# Patient Record
Sex: FEMALE | URBAN - METROPOLITAN AREA
[De-identification: names, ages, dates, MRNs, and addresses within clinical notes are randomized per-mention and may not be internally consistent; named-entity substitution may affect disease eponyms.]

---

## 2017-05-19 VITALS
OXYGEN SATURATION: 99 % | TEMPERATURE: 98 F | DIASTOLIC BLOOD PRESSURE: 72 MMHG | SYSTOLIC BLOOD PRESSURE: 134 MMHG | HEART RATE: 85 BPM | WEIGHT: 132.5 LBS | HEIGHT: 64 IN | RESPIRATION RATE: 16 BRPM

## 2017-05-22 ENCOUNTER — INPATIENT (INPATIENT)
Facility: HOSPITAL | Age: 52
LOS: 1 days | Discharge: HOME | DRG: 27 | End: 2017-05-24
Attending: NEUROLOGICAL SURGERY | Admitting: NEUROLOGICAL SURGERY
Payer: COMMERCIAL

## 2017-05-22 DIAGNOSIS — G50.0 TRIGEMINAL NEURALGIA: ICD-10-CM

## 2017-05-22 DIAGNOSIS — Z98.890 OTHER SPECIFIED POSTPROCEDURAL STATES: Chronic | ICD-10-CM

## 2017-05-22 LAB
ANION GAP SERPL CALC-SCNC: 13 MMOL/L — SIGNIFICANT CHANGE UP (ref 5–17)
APTT BLD: 32.4 SEC — SIGNIFICANT CHANGE UP (ref 27.5–37.4)
BUN SERPL-MCNC: 10 MG/DL — SIGNIFICANT CHANGE UP (ref 7–23)
CALCIUM SERPL-MCNC: 9.2 MG/DL — SIGNIFICANT CHANGE UP (ref 8.4–10.5)
CHLORIDE SERPL-SCNC: 98 MMOL/L — SIGNIFICANT CHANGE UP (ref 96–108)
CO2 SERPL-SCNC: 26 MMOL/L — SIGNIFICANT CHANGE UP (ref 22–31)
CREAT SERPL-MCNC: 0.8 MG/DL — SIGNIFICANT CHANGE UP (ref 0.5–1.3)
GLUCOSE SERPL-MCNC: 108 MG/DL — HIGH (ref 70–99)
HCT VFR BLD CALC: 41.9 % — SIGNIFICANT CHANGE UP (ref 34.5–45)
HGB BLD-MCNC: 14.2 G/DL — SIGNIFICANT CHANGE UP (ref 11.5–15.5)
INR BLD: 0.99 — SIGNIFICANT CHANGE UP (ref 0.88–1.16)
MAGNESIUM SERPL-MCNC: 1.9 MG/DL — SIGNIFICANT CHANGE UP (ref 1.6–2.6)
MCHC RBC-ENTMCNC: 31.8 PG — SIGNIFICANT CHANGE UP (ref 27–34)
MCHC RBC-ENTMCNC: 33.9 G/DL — SIGNIFICANT CHANGE UP (ref 32–36)
MCV RBC AUTO: 93.9 FL — SIGNIFICANT CHANGE UP (ref 80–100)
PHOSPHATE SERPL-MCNC: 2.8 MG/DL — SIGNIFICANT CHANGE UP (ref 2.5–4.5)
PLATELET # BLD AUTO: 280 K/UL — SIGNIFICANT CHANGE UP (ref 150–400)
POTASSIUM SERPL-MCNC: 4.4 MMOL/L — SIGNIFICANT CHANGE UP (ref 3.5–5.3)
POTASSIUM SERPL-SCNC: 4.4 MMOL/L — SIGNIFICANT CHANGE UP (ref 3.5–5.3)
PROTHROM AB SERPL-ACNC: 11 SEC — SIGNIFICANT CHANGE UP (ref 9.8–12.7)
RBC # BLD: 4.46 M/UL — SIGNIFICANT CHANGE UP (ref 3.8–5.2)
RBC # FLD: 13.1 % — SIGNIFICANT CHANGE UP (ref 10.3–16.9)
SODIUM SERPL-SCNC: 137 MMOL/L — SIGNIFICANT CHANGE UP (ref 135–145)
WBC # BLD: 4.7 K/UL — SIGNIFICANT CHANGE UP (ref 3.8–10.5)
WBC # FLD AUTO: 4.7 K/UL — SIGNIFICANT CHANGE UP (ref 3.8–10.5)

## 2017-05-22 PROCEDURE — 61322 CRNEC/CRNOT DCMPRV W/O LOBEC: CPT | Mod: AS

## 2017-05-22 PROCEDURE — 70450 CT HEAD/BRAIN W/O DYE: CPT | Mod: 26

## 2017-05-22 PROCEDURE — 99291 CRITICAL CARE FIRST HOUR: CPT | Mod: 24

## 2017-05-22 RX ORDER — CHOLECALCIFEROL (VITAMIN D3) 125 MCG
2000 CAPSULE ORAL DAILY
Qty: 0 | Refills: 0 | Status: DISCONTINUED | OUTPATIENT
Start: 2017-05-22 | End: 2017-05-24

## 2017-05-22 RX ORDER — SODIUM CHLORIDE 9 MG/ML
1000 INJECTION INTRAMUSCULAR; INTRAVENOUS; SUBCUTANEOUS
Qty: 0 | Refills: 0 | Status: DISCONTINUED | OUTPATIENT
Start: 2017-05-22 | End: 2017-05-23

## 2017-05-22 RX ORDER — SODIUM CHLORIDE 9 MG/ML
1000 INJECTION, SOLUTION INTRAVENOUS
Qty: 0 | Refills: 0 | Status: DISCONTINUED | OUTPATIENT
Start: 2017-05-22 | End: 2017-05-24

## 2017-05-22 RX ORDER — INSULIN LISPRO 100/ML
VIAL (ML) SUBCUTANEOUS EVERY 6 HOURS
Qty: 0 | Refills: 0 | Status: DISCONTINUED | OUTPATIENT
Start: 2017-05-22 | End: 2017-05-22

## 2017-05-22 RX ORDER — CHOLECALCIFEROL (VITAMIN D3) 125 MCG
1 CAPSULE ORAL
Qty: 0 | Refills: 0 | COMMUNITY

## 2017-05-22 RX ORDER — LOVASTATIN 20 MG
1 TABLET ORAL
Qty: 0 | Refills: 0 | COMMUNITY

## 2017-05-22 RX ORDER — INSULIN LISPRO 100/ML
VIAL (ML) SUBCUTANEOUS
Qty: 0 | Refills: 0 | Status: DISCONTINUED | OUTPATIENT
Start: 2017-05-22 | End: 2017-05-24

## 2017-05-22 RX ORDER — LAMOTRIGINE 25 MG/1
1 TABLET, ORALLY DISINTEGRATING ORAL
Qty: 0 | Refills: 0 | COMMUNITY

## 2017-05-22 RX ORDER — ONDANSETRON 8 MG/1
4 TABLET, FILM COATED ORAL EVERY 6 HOURS
Qty: 0 | Refills: 0 | Status: DISCONTINUED | OUTPATIENT
Start: 2017-05-22 | End: 2017-05-22

## 2017-05-22 RX ORDER — DEXTROSE 50 % IN WATER 50 %
1 SYRINGE (ML) INTRAVENOUS ONCE
Qty: 0 | Refills: 0 | Status: DISCONTINUED | OUTPATIENT
Start: 2017-05-22 | End: 2017-05-24

## 2017-05-22 RX ORDER — DEXTROSE 50 % IN WATER 50 %
25 SYRINGE (ML) INTRAVENOUS ONCE
Qty: 0 | Refills: 0 | Status: DISCONTINUED | OUTPATIENT
Start: 2017-05-22 | End: 2017-05-24

## 2017-05-22 RX ORDER — DEXTROSE 50 % IN WATER 50 %
12.5 SYRINGE (ML) INTRAVENOUS ONCE
Qty: 0 | Refills: 0 | Status: DISCONTINUED | OUTPATIENT
Start: 2017-05-22 | End: 2017-05-24

## 2017-05-22 RX ORDER — ACETAMINOPHEN 500 MG
1000 TABLET ORAL ONCE
Qty: 0 | Refills: 0 | Status: COMPLETED | OUTPATIENT
Start: 2017-05-22 | End: 2017-05-22

## 2017-05-22 RX ORDER — ATORVASTATIN CALCIUM 80 MG/1
10 TABLET, FILM COATED ORAL AT BEDTIME
Qty: 0 | Refills: 0 | Status: DISCONTINUED | OUTPATIENT
Start: 2017-05-22 | End: 2017-05-24

## 2017-05-22 RX ORDER — ONDANSETRON 8 MG/1
4 TABLET, FILM COATED ORAL ONCE
Qty: 0 | Refills: 0 | Status: DISCONTINUED | OUTPATIENT
Start: 2017-05-22 | End: 2017-05-22

## 2017-05-22 RX ORDER — LAMOTRIGINE 25 MG/1
100 TABLET, ORALLY DISINTEGRATING ORAL DAILY
Qty: 0 | Refills: 0 | Status: DISCONTINUED | OUTPATIENT
Start: 2017-05-22 | End: 2017-05-24

## 2017-05-22 RX ORDER — FENTANYL CITRATE 50 UG/ML
12.5 INJECTION INTRAVENOUS ONCE
Qty: 0 | Refills: 0 | Status: DISCONTINUED | OUTPATIENT
Start: 2017-05-22 | End: 2017-05-22

## 2017-05-22 RX ORDER — FENTANYL CITRATE 50 UG/ML
25 INJECTION INTRAVENOUS ONCE
Qty: 0 | Refills: 0 | Status: DISCONTINUED | OUTPATIENT
Start: 2017-05-22 | End: 2017-05-22

## 2017-05-22 RX ORDER — DOCUSATE SODIUM 100 MG
100 CAPSULE ORAL ONCE
Qty: 0 | Refills: 0 | Status: COMPLETED | OUTPATIENT
Start: 2017-05-22 | End: 2017-05-22

## 2017-05-22 RX ORDER — INSULIN LISPRO 100/ML
VIAL (ML) SUBCUTANEOUS ONCE
Qty: 0 | Refills: 0 | Status: DISCONTINUED | OUTPATIENT
Start: 2017-05-22 | End: 2017-05-22

## 2017-05-22 RX ORDER — ONDANSETRON 8 MG/1
4 TABLET, FILM COATED ORAL EVERY 6 HOURS
Qty: 0 | Refills: 0 | Status: DISCONTINUED | OUTPATIENT
Start: 2017-05-22 | End: 2017-05-24

## 2017-05-22 RX ORDER — SENNA PLUS 8.6 MG/1
2 TABLET ORAL ONCE
Qty: 0 | Refills: 0 | Status: DISCONTINUED | OUTPATIENT
Start: 2017-05-22 | End: 2017-05-22

## 2017-05-22 RX ORDER — CEFAZOLIN SODIUM 1 G
1000 VIAL (EA) INJECTION EVERY 8 HOURS
Qty: 0 | Refills: 0 | Status: COMPLETED | OUTPATIENT
Start: 2017-05-22 | End: 2017-05-23

## 2017-05-22 RX ORDER — SENNA PLUS 8.6 MG/1
2 TABLET ORAL AT BEDTIME
Qty: 0 | Refills: 0 | Status: DISCONTINUED | OUTPATIENT
Start: 2017-05-22 | End: 2017-05-24

## 2017-05-22 RX ORDER — GLUCAGON INJECTION, SOLUTION 0.5 MG/.1ML
1 INJECTION, SOLUTION SUBCUTANEOUS ONCE
Qty: 0 | Refills: 0 | Status: DISCONTINUED | OUTPATIENT
Start: 2017-05-22 | End: 2017-05-24

## 2017-05-22 RX ADMIN — FENTANYL CITRATE 12.5 MICROGRAM(S): 50 INJECTION INTRAVENOUS at 12:10

## 2017-05-22 RX ADMIN — SENNA PLUS 2 TABLET(S): 8.6 TABLET ORAL at 22:05

## 2017-05-22 RX ADMIN — ATORVASTATIN CALCIUM 10 MILLIGRAM(S): 80 TABLET, FILM COATED ORAL at 22:05

## 2017-05-22 RX ADMIN — FENTANYL CITRATE 12.5 MICROGRAM(S): 50 INJECTION INTRAVENOUS at 11:49

## 2017-05-22 RX ADMIN — SODIUM CHLORIDE 70 MILLILITER(S): 9 INJECTION INTRAMUSCULAR; INTRAVENOUS; SUBCUTANEOUS at 11:57

## 2017-05-22 RX ADMIN — Medication 100 MILLIGRAM(S): at 17:03

## 2017-05-22 RX ADMIN — Medication 1000 MILLIGRAM(S): at 15:40

## 2017-05-22 RX ADMIN — LAMOTRIGINE 100 MILLIGRAM(S): 25 TABLET, ORALLY DISINTEGRATING ORAL at 17:03

## 2017-05-22 RX ADMIN — FENTANYL CITRATE 25 MICROGRAM(S): 50 INJECTION INTRAVENOUS at 13:20

## 2017-05-22 RX ADMIN — FENTANYL CITRATE 25 MICROGRAM(S): 50 INJECTION INTRAVENOUS at 12:49

## 2017-05-22 RX ADMIN — Medication 1 TABLET(S): at 17:04

## 2017-05-22 RX ADMIN — Medication 400 MILLIGRAM(S): at 14:54

## 2017-05-22 RX ADMIN — Medication 2000 UNIT(S): at 17:04

## 2017-05-22 NOTE — H&P PST ADULT - HISTORY OF PRESENT ILLNESS
Pt is 50yo female, PMH: HLD, impaired fasting glucose, Vit D deficiency, Lumbar radiculopathy, L trigeminal neuralgia, s/p L breast biopsy - benign, presents to [facility] for elective retromastoid decompression of Trigeminal nerve after failing conservative treatment.  Pt was medically cleared, clearance is in the chart, pt denies sob, cp, chills/fevers, dysuria.

## 2017-05-22 NOTE — PROGRESS NOTE ADULT - ASSESSMENT
51F with trigeminal neuralgia s/p microvascular decompression of facial nerve (05/22/17, Dr. Donaldson), dyslipidemia 51F with trigeminal neuralgia s/p microvascular decompression of trigeminal nerve (05/22/17, Dr. Donaldson), dyslipidemia

## 2017-05-22 NOTE — PROGRESS NOTE ADULT - SUBJECTIVE AND OBJECTIVE BOX
=================================  NEUROCRITICAL CARE ATTENDING NOTE  =================================    NETTA MONTE   MRN-[id number]  Summary:  51F with dyslipidemia and impaired fasting glucose, Vit D deficiency, lumbar radiculopathy, h/o L trigeminal neuralgia, failed medical treatment.  Today presents for elective trigeminal nerve decompression 05/22.    Overnight Events: Admitted to NSICU this pm    PAST MEDICAL & SURGICAL HISTORY: Trigeminal neuralgia HLD (hyperlipidemia) No pertinent past medical history H/O benign breast biopsy: L breast biopsy  NKDA  PMH: MVI lovastatin 20mg daily lamotrigine 100mg daily vit D3 daily    PHYSICAL EXAMINATION  T(C): , Max: 36.4 (05-22 @ 13:00)  HR: 82 (74 - 90) BP: 135/70 (127/69 - 146/83) RR: 12 (12 - 23) SpO2: 99% (99% - 100%)  NEUROLOGIC EXAMINATION:  Patient is awake, alert, fully oriented, pupils 2-3mm equal and briskly reactive to light, EOMs intact, moving all 4s, V1-V3 intact  GENERAL:  not intubated, not in cardiorespiratory distress  EENT: anicteric  CARDIOVASC:  (+) S1 S2, normal rate and regular rhythm  PULMONARY:  clear to auscultation bilaterally  ABDOMEN:  soft, nontender, with normoactive bowel sounds  EXTREMITIES:  no edema  SKIN:  no rash    LABS:  CAPILLARY BLOOD GLUCOSE 112 (22 May 2017 12:30)                        14.2   4.7   )-----------( 280      ( 22 May 2017 12:52 )             41.9     137  |  98  |  10  ----------------------------<  108<H>  4.4   |  26  |  0.80    Ca    9.2      22 May 2017 12:51  Phos  2.8     05-22  Mg     1.9     05-22    Bacteriology:  CSF studies:  EEG:  Neuroimaging: no recent imaging available  Other imaging:    MEDICATIONS: docusate ancef 1g q8h lamotrigine 100mg daily atorvastatin 10mg HS MVI cholecalciferol 2g daily  senna mod ISS percocet    IV FLUIDS: NS@70cc/hr  DRIPS:  DIET:   Lines / Drains:      CODE STATUS:  full code                       GOALS OF CARE:  aggressive                      DISPOSITION:  ICU

## 2017-05-22 NOTE — PROGRESS NOTE ADULT - SUBJECTIVE AND OBJECTIVE BOX
NEUROSURGERY POST-OP CHECK:    HPI:  Pt is 52yo female, PMH: HLD, impaired fasting glucose, Vit D deficiency, Lumbar radiculopathy, L trigeminal neuralgia, s/p L breast biopsy - benign, presents to [facility] for elective retromastoid decompression of Trigeminal nerve after failing conservative treatment.  Pt was medically cleared, clearance is in the chart, pt denies sob, cp, chills/fevers, dysuria. (22 May 2017 11:26)    OVERNIGHT EVENTS:  Pt seen and examined at bedside. c/o left sided headache (incisional)    Vital Signs Last 24 Hrs  T(C): 36.2, Max: 36.2 (05-22 @ 11:15)  T(F): 97.2, Max: 97.2 (05-22 @ 11:15)  HR: 76 (74 - 82)  BP: 139/77 (139/77 - 146/83)  BP(mean): 100 (100 - 109)  RR: 20 (12 - 23)  SpO2: 100% (100% - 100%)    I&O's Detail    I & Os for current day (as of 22 May 2017 12:47)  =============================================  IN:    sodium chloride 0.9%.: 70 ml    Total IN: 70 ml  ---------------------------------------------  OUT:    Total OUT: 0 ml  ---------------------------------------------  Total NET: 70 ml    I&O's Summary    I & Os for current day (as of 22 May 2017 12:47)  =============================================  IN: 70 ml / OUT: 0 ml / NET: 70 ml      PHYSICAL EXAM:  Neurological:  AAOx3, coherent speech, FC  CNII-XII grossly intact, PERRL, EOMI, normal vision, face symmetric, tongue midline, no facial numbness/tingling  MAEx4, strength 5/5 UE and LE b/l, no drift  SILT throughout b/l      TUBES/LINES:  [] CVC  [] A-line  [] Lumbar Drain  [] Ventriculostomy  [] Other    DIET:  [] NPO  [x] Mechanical  [] Tube feeds    LABS:                  CAPILLARY BLOOD GLUCOSE      Drug Levels: [] N/A    CSF Analysis: [] N/A      Allergies    No Known Allergies    Intolerances      MEDICATIONS:  Antibiotics:  ceFAZolin   IVPB 1000milliGRAM(s) IV Intermittent every 8 hours    Neuro:  acetaminophen  IVPB. 1000milliGRAM(s) IV Intermittent Once  lamoTRIgine 100milliGRAM(s) Oral daily  ondansetron Injectable 4milliGRAM(s) IV Push every 6 hours PRN  fentaNYL    Injectable 25MICROGram(s) IV Push once    Anticoagulation:    OTHER:  docusate sodium 100milliGRAM(s) Oral Once  dextrose Gel 1Dose(s) Oral Once PRN  dextrose 50% Injectable 12.5Gram(s) IV Push Once  dextrose 50% Injectable 25Gram(s) IV Push Once  dextrose 50% Injectable 25Gram(s) IV Push Once  glucagon  Injectable 1milliGRAM(s) IntraMuscular Once PRN  atorvastatin 10milliGRAM(s) Oral at bedtime  insulin lispro (HumaLOG) corrective regimen sliding scale  SubCutaneous every 6 hours  dextrose Gel 1Dose(s) Oral once PRN  dextrose 50% Injectable 12.5Gram(s) IV Push once  dextrose 50% Injectable 25Gram(s) IV Push once  dextrose 50% Injectable 25Gram(s) IV Push once  glucagon  Injectable 1milliGRAM(s) IntraMuscular once PRN  senna 2Tablet(s) Oral at bedtime    IVF:  sodium chloride 0.9%. 1000milliLiter(s) IV Continuous <Continuous>  dextrose 5%. 1000milliLiter(s) IV Continuous <Continuous>  multivitamin 1Tablet(s) Oral daily  cholecalciferol 2000Unit(s) Oral daily  dextrose 5%. 1000milliLiter(s) IV Continuous <Continuous>    CULTURES:    RADIOLOGY & ADDITIONAL TESTS:      ASSESSMENT:  51y Female s/p elective left retromastoid microvascular decompression for trigeminal neuralgia     PLAN:  NEURO:  -neuro checks q1hr  -pain control  -CTH tonight  -continue ancef   -advance diet as tolerated  -SCDs  -d/w Dr. Donaldson NEUROSURGERY POST-OP CHECK:    HPI:  Pt is 50yo female, PMH: HLD, impaired fasting glucose, Vit D deficiency, Lumbar radiculopathy, L trigeminal neuralgia, s/p L breast biopsy - benign, presents to [facility] for elective retromastoid decompression of Trigeminal nerve after failing conservative treatment.  Pt was medically cleared, clearance is in the chart, pt denies sob, cp, chills/fevers, dysuria. (22 May 2017 11:26)    OVERNIGHT EVENTS:  Pt seen and examined at bedside. c/o left sided headache (incisional)    Vital Signs Last 24 Hrs  T(C): 36.2, Max: 36.2 (05-22 @ 11:15)  T(F): 97.2, Max: 97.2 (05-22 @ 11:15)  HR: 76 (74 - 82)  BP: 139/77 (139/77 - 146/83)  BP(mean): 100 (100 - 109)  RR: 20 (12 - 23)  SpO2: 100% (100% - 100%)    I&O's Detail    I & Os for current day (as of 22 May 2017 12:47)  =============================================  IN:    sodium chloride 0.9%.: 70 ml    Total IN: 70 ml  ---------------------------------------------  OUT:    Total OUT: 0 ml  ---------------------------------------------  Total NET: 70 ml    I&O's Summary    I & Os for current day (as of 22 May 2017 12:47)  =============================================  IN: 70 ml / OUT: 0 ml / NET: 70 ml      PHYSICAL EXAM:  Neurological:  AAOx3, coherent speech, FC  CNII-XII grossly intact, PERRL, EOMI, normal vision, face symmetric, tongue midline, no facial numbness/tingling  MAEx4, strength 5/5 UE and LE b/l, no drift  SILT throughout b/l      TUBES/LINES:  [] CVC  [] A-line  [] Lumbar Drain  [] Ventriculostomy  [] Other    DIET:  [] NPO  [x] Mechanical  [] Tube feeds    LABS:                  CAPILLARY BLOOD GLUCOSE      Drug Levels: [] N/A    CSF Analysis: [] N/A      Allergies    No Known Allergies    Intolerances      MEDICATIONS:  Antibiotics:  ceFAZolin   IVPB 1000milliGRAM(s) IV Intermittent every 8 hours    Neuro:  acetaminophen  IVPB. 1000milliGRAM(s) IV Intermittent Once  lamoTRIgine 100milliGRAM(s) Oral daily  ondansetron Injectable 4milliGRAM(s) IV Push every 6 hours PRN  fentaNYL    Injectable 25MICROGram(s) IV Push once    Anticoagulation:    OTHER:  docusate sodium 100milliGRAM(s) Oral Once  dextrose Gel 1Dose(s) Oral Once PRN  dextrose 50% Injectable 12.5Gram(s) IV Push Once  dextrose 50% Injectable 25Gram(s) IV Push Once  dextrose 50% Injectable 25Gram(s) IV Push Once  glucagon  Injectable 1milliGRAM(s) IntraMuscular Once PRN  atorvastatin 10milliGRAM(s) Oral at bedtime  insulin lispro (HumaLOG) corrective regimen sliding scale  SubCutaneous every 6 hours  dextrose Gel 1Dose(s) Oral once PRN  dextrose 50% Injectable 12.5Gram(s) IV Push once  dextrose 50% Injectable 25Gram(s) IV Push once  dextrose 50% Injectable 25Gram(s) IV Push once  glucagon  Injectable 1milliGRAM(s) IntraMuscular once PRN  senna 2Tablet(s) Oral at bedtime    IVF:  sodium chloride 0.9%. 1000milliLiter(s) IV Continuous <Continuous>  dextrose 5%. 1000milliLiter(s) IV Continuous <Continuous>  multivitamin 1Tablet(s) Oral daily  cholecalciferol 2000Unit(s) Oral daily  dextrose 5%. 1000milliLiter(s) IV Continuous <Continuous>    CULTURES:    RADIOLOGY & ADDITIONAL TESTS:      ASSESSMENT:  51y Female s/p elective left retromastoid microvascular decompression for trigeminal neuralgia     PLAN:  -neuro checks q1hr  -pain control  -CTH tonight  -continue ancef   -advance diet as tolerated  -SCDs  -d/w Dr. Donaldson

## 2017-05-22 NOTE — H&P PST ADULT - ASSESSMENT
Pt is 50yo female with L Trigeminal Neuralgia, failed conservative treatment, here for elective L retromastoid microvascular decompression

## 2017-05-23 LAB
ANION GAP SERPL CALC-SCNC: 9 MMOL/L — SIGNIFICANT CHANGE UP (ref 5–17)
BUN SERPL-MCNC: 14 MG/DL — SIGNIFICANT CHANGE UP (ref 7–23)
CALCIUM SERPL-MCNC: 8.6 MG/DL — SIGNIFICANT CHANGE UP (ref 8.4–10.5)
CHLORIDE SERPL-SCNC: 102 MMOL/L — SIGNIFICANT CHANGE UP (ref 96–108)
CO2 SERPL-SCNC: 27 MMOL/L — SIGNIFICANT CHANGE UP (ref 22–31)
CREAT SERPL-MCNC: 1.1 MG/DL — SIGNIFICANT CHANGE UP (ref 0.5–1.3)
GLUCOSE SERPL-MCNC: 105 MG/DL — HIGH (ref 70–99)
HBA1C BLD-MCNC: 5.7 % — HIGH (ref 4–5.6)
HCT VFR BLD CALC: 39.3 % — SIGNIFICANT CHANGE UP (ref 34.5–45)
HGB BLD-MCNC: 13.7 G/DL — SIGNIFICANT CHANGE UP (ref 11.5–15.5)
MAGNESIUM SERPL-MCNC: 2 MG/DL — SIGNIFICANT CHANGE UP (ref 1.6–2.6)
MCHC RBC-ENTMCNC: 32.9 PG — SIGNIFICANT CHANGE UP (ref 27–34)
MCHC RBC-ENTMCNC: 34.9 G/DL — SIGNIFICANT CHANGE UP (ref 32–36)
MCV RBC AUTO: 94.5 FL — SIGNIFICANT CHANGE UP (ref 80–100)
PHOSPHATE SERPL-MCNC: 4 MG/DL — SIGNIFICANT CHANGE UP (ref 2.5–4.5)
PLATELET # BLD AUTO: 258 K/UL — SIGNIFICANT CHANGE UP (ref 150–400)
POTASSIUM SERPL-MCNC: 4.3 MMOL/L — SIGNIFICANT CHANGE UP (ref 3.5–5.3)
POTASSIUM SERPL-SCNC: 4.3 MMOL/L — SIGNIFICANT CHANGE UP (ref 3.5–5.3)
RBC # BLD: 4.16 M/UL — SIGNIFICANT CHANGE UP (ref 3.8–5.2)
RBC # FLD: 13.4 % — SIGNIFICANT CHANGE UP (ref 10.3–16.9)
SODIUM SERPL-SCNC: 138 MMOL/L — SIGNIFICANT CHANGE UP (ref 135–145)
WBC # BLD: 8.8 K/UL — SIGNIFICANT CHANGE UP (ref 3.8–10.5)
WBC # FLD AUTO: 8.8 K/UL — SIGNIFICANT CHANGE UP (ref 3.8–10.5)

## 2017-05-23 PROCEDURE — 99291 CRITICAL CARE FIRST HOUR: CPT | Mod: 24

## 2017-05-23 RX ORDER — METOCLOPRAMIDE HCL 10 MG
10 TABLET ORAL EVERY 6 HOURS
Qty: 0 | Refills: 0 | Status: DISCONTINUED | OUTPATIENT
Start: 2017-05-23 | End: 2017-05-24

## 2017-05-23 RX ORDER — ENOXAPARIN SODIUM 100 MG/ML
40 INJECTION SUBCUTANEOUS AT BEDTIME
Qty: 0 | Refills: 0 | Status: DISCONTINUED | OUTPATIENT
Start: 2017-05-23 | End: 2017-05-24

## 2017-05-23 RX ADMIN — Medication 104 MILLIGRAM(S): at 14:39

## 2017-05-23 RX ADMIN — ATORVASTATIN CALCIUM 10 MILLIGRAM(S): 80 TABLET, FILM COATED ORAL at 22:47

## 2017-05-23 RX ADMIN — LAMOTRIGINE 100 MILLIGRAM(S): 25 TABLET, ORALLY DISINTEGRATING ORAL at 11:44

## 2017-05-23 RX ADMIN — ONDANSETRON 4 MILLIGRAM(S): 8 TABLET, FILM COATED ORAL at 09:21

## 2017-05-23 RX ADMIN — Medication 2000 UNIT(S): at 11:43

## 2017-05-23 RX ADMIN — SENNA PLUS 2 TABLET(S): 8.6 TABLET ORAL at 22:47

## 2017-05-23 RX ADMIN — Medication 1 TABLET(S): at 11:44

## 2017-05-23 RX ADMIN — Medication 100 MILLIGRAM(S): at 01:00

## 2017-05-23 RX ADMIN — Medication 100 MILLIGRAM(S): at 09:00

## 2017-05-23 NOTE — PROGRESS NOTE ADULT - ATTENDING COMMENTS
PLAN:   NEURO: neurochecks q4h, PRN pain meds with Tylenol / percocet  trigeminal neuralgia s/p decompression: continue lamotrigine  REHAB:  physical therapy evaluation and management    EARLY MOB:  ambulate with PT    PULM:  Room air, incentive spirometry  CARDIO:  SBP goal 100-150mm Hg  ENDO:  Blood sugar goals 140-180 mg/dL, continue insulin sliding scale, Diabetes Mellitus education - new diagnosed prediabetic; continue lovastatin  GI:  docusate senna  DIET: regular diet  RENAL:  IV locked  HEM/ONC: Hb stable  VTE Prophylaxis:  SCDs, start SQL tonight  ID: afebrile, no leukocytosis  Social: will update family    Patient not at high risk for neurologic deterioration / death.  Time spent on this noncritically ill patient: 45 minutes.
PLAN:   NEURO: neurochecks q1h, PRN pain meds with Tylenol / percocet  trigeminal neuralgia s/p decompression: continue lamotrigine, CT tonight  REHAB:  physical therapy evaluation and management    EARLY MOB:  OOB to chair    PULM:  Room air, incentive spirometry  CARDIO:  SBP goal 100-150mm Hg  ENDO:  Blood sugar goals 140-180 mg/dL, continue insulin sliding scale, continue lovastatin  GI:  docusate senna  DIET: advance as tolerated  RENAL:  d/c IVF once adequate PO intake  HEM/ONC: Hb stable  VTE Prophylaxis:  SCDs, no DVT chemoprophylaxis as patient is fresh post-op  ID: afebrile, no leukocytosis; perioperative ancef then d/c  Social: will update family    Patient at high risk for neurological deterioration or death due to:  intracranial bleed, ICU delirium.  Critical care time, excluding procedures: 60 minutes.

## 2017-05-23 NOTE — PHYSICAL THERAPY INITIAL EVALUATION ADULT - PERTINENT HX OF CURRENT PROBLEM, REHAB EVAL
Pt is 50yo female, PMH: HLD, impaired fasting glucose, Vit D deficiency, Lumbar radiculopathy, L trigeminal neuralgia, s/p L breast biopsy - benign, presents to [facility] for elective retromastoid decompression of Trigeminal nerve after failing conservative treatment.  Pt was medically cleared, clearance is in the chart, pt denies sob, cp, chills/fevers, dysuria

## 2017-05-23 NOTE — PROGRESS NOTE ADULT - ASSESSMENT
51F with trigeminal neuralgia s/p microvascular decompression of trigeminal nerve (05/22/17, Dr. Donaldson), dyslipidemia; new diagnosed prediabetes

## 2017-05-23 NOTE — PHYSICAL THERAPY INITIAL EVALUATION ADULT - ADDITIONAL COMMENTS
Pt reports independence in all functional mobility prior to surgery. Pt does not need equipment prior to discharge. Pt reports independence in all functional mobility prior to surgery. Pt does not need equipment prior to discharge. Pt reports that there are 2 flights of stairs to get into home

## 2017-05-23 NOTE — PHYSICAL THERAPY INITIAL EVALUATION ADULT - ACTIVE RANGE OF MOTION EXAMINATION, REHAB EVAL
Right LE Active ROM was WFL (within functional limits)/Left UE Active ROM was WFL (within functional limits)/Left LE Active ROM was WFL (within functional limits)/Right UE Active ROM was WFL (within functional limits)

## 2017-05-23 NOTE — PROGRESS NOTE ADULT - SUBJECTIVE AND OBJECTIVE BOX
HPI:  Pt is 50yo female, PMH: HLD, impaired fasting glucose, Vit D deficiency, Lumbar radiculopathy, L trigeminal neuralgia, s/p L breast biopsy - benign, presents to [facility] for elective retromastoid decompression of Trigeminal nerve after failing conservative treatment.  Pt was medically cleared, clearance is in the chart, pt denies sob, cp, chills/fevers, dysuria. (22 May 2017 11:26)    OVERNIGHT EVENTS: No acute events overnight.    Vital Signs Last 24 Hrs  T(C): 36.9, Max: 37.6 (05-23 @ 02:20)  T(F): 98.4, Max: 99.6 (05-23 @ 02:20)  HR: 76 (72 - 96)  BP: 125/69 (107/63 - 146/83)  BP(mean): 89 (73 - 109)  RR: 24 (11 - 33)  SpO2: 100% (97% - 100%)    I&O's Detail  I & Os for 24h ending 23 May 2017 07:00  =============================================  IN:    sodium chloride 0.9%: 1260 ml    IV PiggyBack: 250 ml    Oral Fluid: 80 ml    Total IN: 1590 ml  ---------------------------------------------  OUT:    Voided: 3025 ml    Total OUT: 3025 ml  ---------------------------------------------  Total NET: -1435 ml    I & Os for current day (as of 23 May 2017 10:03)  =============================================  IN:    IV PiggyBack: 50 ml    Total IN: 50 ml  ---------------------------------------------  OUT:    Total OUT: 0 ml  ---------------------------------------------  Total NET: 50 ml    I&O's Summary  I & Os for 24h ending 23 May 2017 07:00  =============================================  IN: 1590 ml / OUT: 3025 ml / NET: -1435 ml    I & Os for current day (as of 23 May 2017 10:03)  =============================================  IN: 50 ml / OUT: 0 ml / NET: 50 ml      PHYSICAL EXAM:  Neurological: AAOx3, FC, speech coherent  CNII-XII: EOM intact, PERRL, V1-V3 intact  Motor: MAEx4 5/5 UE and LE B/L         [x] warm well perfused; capillary refill <3 seconds   Sensation: [x] intact to light touch  [] decreased:     TUBES/LINES:  [] CVC  [] A-line  [] Lumbar Drain  [] Ventriculostomy  [] Other    DIET:  [] NPO  [x] Mechanical  [] Tube feeds    LABS:                        13.7   8.8   )-----------( 258      ( 23 May 2017 06:29 )             39.3     05-23    138  |  102  |  14  ----------------------------<  105<H>  4.3   |  27  |  1.10    Ca    8.6      23 May 2017 06:29  Phos  4.0     05-23  Mg     2.0     05-23      PT/INR - ( 22 May 2017 12:52 )   PT: 11.0 sec;   INR: 0.99          PTT - ( 22 May 2017 12:52 )  PTT:32.4 sec        CAPILLARY BLOOD GLUCOSE  110 (22 May 2017 22:00)  106 (22 May 2017 17:00)  112 (22 May 2017 12:30)      Drug Levels: [] N/A    CSF Analysis: [] N/A      Allergies    No Known Allergies    Intolerances      MEDICATIONS:  Antibiotics:    Neuro:  lamoTRIgine 100milliGRAM(s) Oral daily  ondansetron Injectable 4milliGRAM(s) IV Push every 6 hours PRN  oxyCODONE  5 mG/acetaminophen 325 mG 1Tablet(s) Oral every 4 hours PRN  oxyCODONE  5 mG/acetaminophen 325 mG 2Tablet(s) Oral every 4 hours PRN    Anticoagulation:  enoxaparin Injectable 40milliGRAM(s) SubCutaneous at bedtime    OTHER:  dextrose Gel 1Dose(s) Oral Once PRN  dextrose 50% Injectable 12.5Gram(s) IV Push Once  dextrose 50% Injectable 25Gram(s) IV Push Once  dextrose 50% Injectable 25Gram(s) IV Push Once  glucagon  Injectable 1milliGRAM(s) IntraMuscular Once PRN  atorvastatin 10milliGRAM(s) Oral at bedtime  dextrose Gel 1Dose(s) Oral once PRN  dextrose 50% Injectable 12.5Gram(s) IV Push once  dextrose 50% Injectable 25Gram(s) IV Push once  dextrose 50% Injectable 25Gram(s) IV Push once  glucagon  Injectable 1milliGRAM(s) IntraMuscular once PRN  senna 2Tablet(s) Oral at bedtime  insulin lispro (HumaLOG) corrective regimen sliding scale  SubCutaneous Before meals and at bedtime    IVF:  dextrose 5%. 1000milliLiter(s) IV Continuous <Continuous>  multivitamin 1Tablet(s) Oral daily  cholecalciferol 2000Unit(s) Oral daily  dextrose 5%. 1000milliLiter(s) IV Continuous <Continuous>    CULTURES:    RADIOLOGY & ADDITIONAL TESTS:  [facility] 5/22/17:   Status post left retromastoid craniectomy for microvascular decompression   along the left trigeminal nerve.     Expected postoperative changes, as above. No large hemorrhage, mass   effect or evidence of ischemic injury.      ASSESSMENT:  51y Female with trigeminal neuralgia s/p microvascular decompression of trigeminal nerve 5/22    PLAN:  NEURO:  -neuro checks   -pain control  -trigeminal neuralgia: continue lamictal  -stepdown    CARDIOVASCULAR:  -SBP goal 100-150    PULMONARY:  -room air, incentive spirometry    RENAL:  -IVL    GI:  -docusate/senna    HEME:  -H/H stable, no issues    ID:  -afebrile, no issues    ENDO:  -ISS    DVT PROPHYLAXIS:  [x] Venodynes                                [] Heparin/Lovenox  -Start SQL tonight    -D/w

## 2017-05-24 VITALS — TEMPERATURE: 97 F

## 2017-05-24 LAB
ANION GAP SERPL CALC-SCNC: 11 MMOL/L — SIGNIFICANT CHANGE UP (ref 5–17)
BUN SERPL-MCNC: 12 MG/DL — SIGNIFICANT CHANGE UP (ref 7–23)
CALCIUM SERPL-MCNC: 8.8 MG/DL — SIGNIFICANT CHANGE UP (ref 8.4–10.5)
CHLORIDE SERPL-SCNC: 98 MMOL/L — SIGNIFICANT CHANGE UP (ref 96–108)
CO2 SERPL-SCNC: 28 MMOL/L — SIGNIFICANT CHANGE UP (ref 22–31)
CREAT SERPL-MCNC: 0.9 MG/DL — SIGNIFICANT CHANGE UP (ref 0.5–1.3)
GLUCOSE SERPL-MCNC: 98 MG/DL — SIGNIFICANT CHANGE UP (ref 70–99)
HCT VFR BLD CALC: 37.7 % — SIGNIFICANT CHANGE UP (ref 34.5–45)
HGB BLD-MCNC: 12.5 G/DL — SIGNIFICANT CHANGE UP (ref 11.5–15.5)
MAGNESIUM SERPL-MCNC: 2 MG/DL — SIGNIFICANT CHANGE UP (ref 1.6–2.6)
MCHC RBC-ENTMCNC: 31.4 PG — SIGNIFICANT CHANGE UP (ref 27–34)
MCHC RBC-ENTMCNC: 33.2 G/DL — SIGNIFICANT CHANGE UP (ref 32–36)
MCV RBC AUTO: 94.7 FL — SIGNIFICANT CHANGE UP (ref 80–100)
PHOSPHATE SERPL-MCNC: 3.2 MG/DL — SIGNIFICANT CHANGE UP (ref 2.5–4.5)
PLATELET # BLD AUTO: 238 K/UL — SIGNIFICANT CHANGE UP (ref 150–400)
POTASSIUM SERPL-MCNC: 4 MMOL/L — SIGNIFICANT CHANGE UP (ref 3.5–5.3)
POTASSIUM SERPL-SCNC: 4 MMOL/L — SIGNIFICANT CHANGE UP (ref 3.5–5.3)
RBC # BLD: 3.98 M/UL — SIGNIFICANT CHANGE UP (ref 3.8–5.2)
RBC # FLD: 13.2 % — SIGNIFICANT CHANGE UP (ref 10.3–16.9)
SODIUM SERPL-SCNC: 137 MMOL/L — SIGNIFICANT CHANGE UP (ref 135–145)
WBC # BLD: 6.2 K/UL — SIGNIFICANT CHANGE UP (ref 3.8–10.5)
WBC # FLD AUTO: 6.2 K/UL — SIGNIFICANT CHANGE UP (ref 3.8–10.5)

## 2017-05-24 PROCEDURE — 86850 RBC ANTIBODY SCREEN: CPT

## 2017-05-24 PROCEDURE — 86900 BLOOD TYPING SEROLOGIC ABO: CPT

## 2017-05-24 PROCEDURE — 86901 BLOOD TYPING SEROLOGIC RH(D): CPT

## 2017-05-24 PROCEDURE — 83036 HEMOGLOBIN GLYCOSYLATED A1C: CPT

## 2017-05-24 PROCEDURE — C1768: CPT

## 2017-05-24 PROCEDURE — 80048 BASIC METABOLIC PNL TOTAL CA: CPT

## 2017-05-24 PROCEDURE — 95940 IONM IN OPERATNG ROOM 15 MIN: CPT

## 2017-05-24 PROCEDURE — 85730 THROMBOPLASTIN TIME PARTIAL: CPT

## 2017-05-24 PROCEDURE — 36415 COLL VENOUS BLD VENIPUNCTURE: CPT

## 2017-05-24 PROCEDURE — 97161 PT EVAL LOW COMPLEX 20 MIN: CPT

## 2017-05-24 PROCEDURE — C1713: CPT

## 2017-05-24 PROCEDURE — 70450 CT HEAD/BRAIN W/O DYE: CPT

## 2017-05-24 PROCEDURE — C1889: CPT

## 2017-05-24 PROCEDURE — 85610 PROTHROMBIN TIME: CPT

## 2017-05-24 PROCEDURE — 84100 ASSAY OF PHOSPHORUS: CPT

## 2017-05-24 PROCEDURE — 85027 COMPLETE CBC AUTOMATED: CPT

## 2017-05-24 PROCEDURE — 83735 ASSAY OF MAGNESIUM: CPT

## 2017-05-24 RX ORDER — SENNA PLUS 8.6 MG/1
2 TABLET ORAL
Qty: 0 | Refills: 0 | COMMUNITY
Start: 2017-05-24

## 2017-05-24 RX ADMIN — Medication 1 TABLET(S): at 12:02

## 2017-05-24 RX ADMIN — Medication 2000 UNIT(S): at 12:02

## 2017-05-24 RX ADMIN — LAMOTRIGINE 100 MILLIGRAM(S): 25 TABLET, ORALLY DISINTEGRATING ORAL at 12:02

## 2017-05-24 RX ADMIN — ENOXAPARIN SODIUM 40 MILLIGRAM(S): 100 INJECTION SUBCUTANEOUS at 00:51

## 2017-05-24 NOTE — PROGRESS NOTE ADULT - SUBJECTIVE AND OBJECTIVE BOX
HPI:  Pt is 52yo female, PMH: HLD, impaired fasting glucose, Vit D deficiency, Lumbar radiculopathy, L trigeminal neuralgia, s/p L breast biopsy - benign, presents to [facility] for elective retromastoid decompression of Trigeminal nerve after failing conservative treatment.  Pt was medically cleared, clearance is in the chart, pt denies sob, cp, chills/fevers, dysuria. (22 May 2017 11:26)    OVERNIGHT EVENTS:  Pt w/o complaints. Sitting in chair. States incisional pain improved.  Vital Signs Last 24 Hrs  T(C): 36.6, Max: 37.1 (05-23 @ 14:37)  T(F): 97.9, Max: 98.7 (05-23 @ 14:37)  HR: 80 (72 - 90)  BP: 122/70 (106/56 - 136/74)  BP(mean): 90 (74 - 99)  RR: 12 (11 - 25)  SpO2: 97% (97% - 100%)    I&O's Summary    I & Os for current day (as of 24 May 2017 09:01)  =============================================  IN: 50 ml / OUT: 1050 ml / NET: -1000 ml      PHYSICAL EXAM:  Neurological:  AxOx3  face symmetric  sensory intact  Incision/Wound:C/D/I dressing removed      DIET:  [] NPO  [x] Mechanical  [] Tube feeds    LABS:                        12.5   6.2   )-----------( 238      ( 24 May 2017 05:42 )             37.7     05-24    137  |  98  |  12  ----------------------------<  98  4.0   |  28  |  0.90    Ca    8.8      24 May 2017 05:41  Phos  3.2     05-24  Mg     2.0     05-24      PT/INR - ( 22 May 2017 12:52 )   PT: 11.0 sec;   INR: 0.99          PTT - ( 22 May 2017 12:52 )  PTT:32.4 sec        CAPILLARY BLOOD GLUCOSE  88 (24 May 2017 05:02)  127 (23 May 2017 21:10)  131 (23 May 2017 17:12)  112 (23 May 2017 11:35)      Drug Levels: [] N/A    CSF Analysis: [] N/A      Allergies    No Known Allergies    Intolerances      MEDICATIONS:  Antibiotics:    Neuro:  lamoTRIgine 100milliGRAM(s) Oral daily  ondansetron Injectable 4milliGRAM(s) IV Push every 6 hours PRN  oxyCODONE  5 mG/acetaminophen 325 mG 1Tablet(s) Oral every 4 hours PRN  oxyCODONE  5 mG/acetaminophen 325 mG 2Tablet(s) Oral every 4 hours PRN  metoclopramide  IVPB 10milliGRAM(s) IV Intermittent every 6 hours PRN  metoclopramide  IVPB 10milliGRAM(s) IV Intermittent every 6 hours PRN    Anticoagulation:  enoxaparin Injectable 40milliGRAM(s) SubCutaneous at bedtime    OTHER:  dextrose Gel 1Dose(s) Oral Once PRN  dextrose 50% Injectable 12.5Gram(s) IV Push Once  dextrose 50% Injectable 25Gram(s) IV Push Once  dextrose 50% Injectable 25Gram(s) IV Push Once  glucagon  Injectable 1milliGRAM(s) IntraMuscular Once PRN  atorvastatin 10milliGRAM(s) Oral at bedtime  dextrose Gel 1Dose(s) Oral once PRN  dextrose 50% Injectable 12.5Gram(s) IV Push once  dextrose 50% Injectable 25Gram(s) IV Push once  dextrose 50% Injectable 25Gram(s) IV Push once  glucagon  Injectable 1milliGRAM(s) IntraMuscular once PRN  senna 2Tablet(s) Oral at bedtime  insulin lispro (HumaLOG) corrective regimen sliding scale  SubCutaneous Before meals and at bedtime    IVF:  dextrose 5%. 1000milliLiter(s) IV Continuous <Continuous>  multivitamin 1Tablet(s) Oral daily  cholecalciferol 2000Unit(s) Oral daily  dextrose 5%. 1000milliLiter(s) IV Continuous <Continuous>    CULTURES:    RADIOLOGY & ADDITIONAL TESTS:      ASSESSMENT:  51y Female s/p left craniotomy microvascular decompression for trigeminal neuralgia.    PLAN:  -pain management  -encourage IS and OOB  -d/c home today to follow up in office in 2 weeks  -D/W     DVT PROPHYLAXIS:  [x] Venodynes                                [x] Lovenox

## 2017-05-24 NOTE — DISCHARGE NOTE ADULT - PATIENT PORTAL LINK FT
“You can access the [facility] Patient Portal, offered by [facility], by registering with the following website: [email or web address]”

## 2017-05-24 NOTE — DISCHARGE NOTE ADULT - CARE PLAN
Principal Discharge DX:	Trigeminal neuralgia  Goal:	improve daily function  Instructions for follow-up, activity and diet:	No strenuous activity. Keep wound clean and dry. May shower and wash wound starting 5/26. Make appointment in 2 weeks for suture removal. Call MD if temp>101, worsening pain, drainage or redness of wound, seizure, mental status change.   Do not take blood thinners or NSAIDS (motrin , aspirin ect) until ok by

## 2017-05-24 NOTE — DISCHARGE NOTE ADULT - CARE PROVIDER_API CALL
Adan Donaldson), Neurological Surgery  [address]  [place] [address]  Phone: [phone]  Fax: [phone]

## 2017-05-24 NOTE — DISCHARGE NOTE ADULT - MEDICATION SUMMARY - MEDICATIONS TO TAKE
I will START or STAY ON the medications listed below when I get home from the hospital:    Percocet 5/325 oral tablet  -- 2 tab(s) by mouth every 4 hours, As needed, Severe Pain (7 - 10) MDD:4gm tylenol  -- Indication: For pain    lamoTRIgine 100 mg oral tablet  -- 1 tab(s) by mouth once a day  -- Indication: For Trigeminal neuralgia    lovastatin 20 mg oral tablet  -- 1 tab(s) by mouth once a day  -- Indication: For HLD (hyperlipidemia)    senna oral tablet  -- 2 tab(s) by mouth once a day (at bedtime)  -- Indication: For constipation    multivitamin  -- 1 tab(s) by mouth once a day  -- Indication: For multivitamin    Vitamin D3 2000 intl units oral tablet  -- 1 tab(s) by mouth once a day  -- Indication: For multivitamin

## 2017-05-24 NOTE — DISCHARGE NOTE ADULT - HOSPITAL COURSE
51F with trigeminal neuralgia s/p microvascular decompression of trigeminal nerve (05/22/17, Dr. Donaldson), dyslipidemia; new diagnosed prediabetes. No complications post op.

## 2017-05-24 NOTE — DISCHARGE NOTE ADULT - PLAN OF CARE
improve daily function No strenuous activity. Keep wound clean and dry. May shower and wash wound starting 5/26. Make appointment in 2 weeks for suture removal. Call MD if temp>101, worsening pain, drainage or redness of wound, seizure, mental status change.   Do not take blood thinners or NSAIDS (motrin , aspirin ect) until ok by

## 2017-05-26 DIAGNOSIS — M54.16 RADICULOPATHY, LUMBAR REGION: ICD-10-CM

## 2017-05-26 DIAGNOSIS — R73.03 PREDIABETES: ICD-10-CM

## 2017-05-26 DIAGNOSIS — G50.0 TRIGEMINAL NEURALGIA: ICD-10-CM

## 2017-05-26 DIAGNOSIS — E78.5 HYPERLIPIDEMIA, UNSPECIFIED: ICD-10-CM

## 2017-05-26 DIAGNOSIS — E55.9 VITAMIN D DEFICIENCY, UNSPECIFIED: ICD-10-CM

## 2018-01-16 NOTE — PROGRESS NOTE ADULT - SUBJECTIVE AND OBJECTIVE BOX
=================================  NEUROCRITICAL CARE ATTENDING NOTE  =================================    NETTA MONTE   MRN-[id number]  Summary:  51F with dyslipidemia and impaired fasting glucose, Vit D deficiency, lumbar radiculopathy, h/o L trigeminal neuralgia, failed medical treatment.  Today presents for elective trigeminal nerve decompression 05/22.    Overnight Events: no events overnight  REVIEW OF SYSTEMS:  No headaches, no nausea or vomiting; 14 -point review of systems otherwise unremarkable.    PAST MEDICAL & SURGICAL HISTORY: Trigeminal neuralgia HLD (hyperlipidemia) No pertinent past medical history H/O benign breast biopsy: L breast biopsy  NKDA  PMH: MVI lovastatin 20mg daily lamotrigine 100mg daily vit D3 daily    PHYSICAL EXAMINATION  T(C): , Max: 37.6 (05-23 @ 02:20) HR: 86 (72 - 96) BP: 120/70 (107/63 - 146/83) RR: 33 (11 - 33) SpO2: 100% (97% - 100%)  NEUROLOGIC EXAMINATION:  Patient is awake, alert, fully oriented, pupils 2-3mm equal and briskly reactive to light, EOMs intact, moving all 4s, V1-V3 intact  GENERAL:  not intubated, not in cardiorespiratory distress  EENT: anicteric  CARDIOVASC:  (+) S1 S2, normal rate and regular rhythm  PULMONARY:  clear to auscultation bilaterally  ABDOMEN:  soft, nontender, with normoactive bowel sounds  EXTREMITIES:  no edema  SKIN:  no rash    LABS:  CAPILLARY BLOOD GLUCOSE  110 (22 May 2017 22:00) 106 (22 May 2017 17:00) 112 (22 May 2017 12:30)                        13.7   8.8   )-----------( 258      ( 23 May 2017 06:29 )             39.3     138  |  102  |  14  ----------------------------<  105<H>  4.3   |  27  |  1.10    Ca    8.6      23 May 2017 06:29  Phos  4.0     05-23  Mg     2.0     05-23    I & Os for current day (as of 05-23 @ 08:27)  IN: 1990 ml / OUT: 3025 ml / NET: -1035 ml    A1C 5.7 (new prediabetic)    Bacteriology:  CSF studies:  EEG:  Neuroimaging: CT head 05/22 - post op changes  Other imaging:    MEDICATIONS: docusate lamotrigine 100mg daily atorvastatin 10mg HS MVI cholecalciferol 2g daily  senna mod ISS percocet    IV FLUIDS:   DRIPS:  DIET:  CCD  Lines / Drains:      CODE STATUS:  full code                       GOALS OF CARE:  aggressive                      DISPOSITION:  ICU/stepdown Additional Safety/Bands:

## 2021-09-14 NOTE — H&P PST ADULT - BREASTS
No masses; no nipple discharge Humira Counseling:  I discussed with the patient the risks of adalimumab including but not limited to myelosuppression, immunosuppression, autoimmune hepatitis, demyelinating diseases, lymphoma, and serious infections.  The patient understands that monitoring is required including a PPD at baseline and must alert us or the primary physician if symptoms of infection or other concerning signs are noted.